# Patient Record
Sex: MALE | Race: WHITE | Employment: FULL TIME | URBAN - METROPOLITAN AREA
[De-identification: names, ages, dates, MRNs, and addresses within clinical notes are randomized per-mention and may not be internally consistent; named-entity substitution may affect disease eponyms.]

---

## 2020-12-01 ENCOUNTER — CLINICAL SUPPORT (OUTPATIENT)
Dept: URGENT CARE | Facility: CLINIC | Age: 60
End: 2020-12-01
Payer: COMMERCIAL

## 2020-12-01 VITALS — TEMPERATURE: 98 F

## 2020-12-01 DIAGNOSIS — Z11.9 SCREENING EXAMINATION FOR UNSPECIFIED INFECTIOUS DISEASE: Primary | ICD-10-CM

## 2020-12-01 LAB
CTP QC/QA: YES
SARS-COV-2 RDRP RESP QL NAA+PROBE: NEGATIVE

## 2020-12-01 PROCEDURE — U0002: ICD-10-PCS | Mod: QW,S$GLB,, | Performed by: EMERGENCY MEDICINE

## 2020-12-01 PROCEDURE — 99201 PR OFFICE/OUTPT VISIT,NEW,LEVL I: ICD-10-PCS | Mod: S$GLB,,, | Performed by: EMERGENCY MEDICINE

## 2020-12-01 PROCEDURE — 99201 PR OFFICE/OUTPT VISIT,NEW,LEVL I: CPT | Mod: S$GLB,,, | Performed by: EMERGENCY MEDICINE

## 2020-12-01 PROCEDURE — U0002 COVID-19 LAB TEST NON-CDC: HCPCS | Mod: QW,S$GLB,, | Performed by: EMERGENCY MEDICINE

## 2021-08-05 ENCOUNTER — CLINICAL SUPPORT (OUTPATIENT)
Dept: URGENT CARE | Facility: CLINIC | Age: 61
End: 2021-08-05
Payer: COMMERCIAL

## 2021-08-05 DIAGNOSIS — Z13.9 ENCOUNTER FOR SCREENING: Primary | ICD-10-CM

## 2021-08-05 LAB
CTP QC/QA: YES
SARS-COV-2 RDRP RESP QL NAA+PROBE: NEGATIVE

## 2021-08-05 PROCEDURE — U0002 COVID-19 LAB TEST NON-CDC: HCPCS | Mod: QW,S$GLB,, | Performed by: SURGERY

## 2021-08-05 PROCEDURE — U0002: ICD-10-PCS | Mod: QW,S$GLB,, | Performed by: SURGERY

## 2021-09-29 ENCOUNTER — CLINICAL SUPPORT (OUTPATIENT)
Dept: URGENT CARE | Facility: CLINIC | Age: 61
End: 2021-09-29
Payer: COMMERCIAL

## 2021-09-29 DIAGNOSIS — Z13.9 ENCOUNTER FOR SCREENING: Primary | ICD-10-CM

## 2021-09-29 LAB
CTP QC/QA: YES
SARS-COV-2 RDRP RESP QL NAA+PROBE: NEGATIVE

## 2021-09-29 PROCEDURE — U0002: ICD-10-PCS | Mod: QW,S$GLB,, | Performed by: NURSE PRACTITIONER

## 2021-09-29 PROCEDURE — U0002 COVID-19 LAB TEST NON-CDC: HCPCS | Mod: QW,S$GLB,, | Performed by: NURSE PRACTITIONER

## 2022-06-24 ENCOUNTER — OFFICE VISIT (OUTPATIENT)
Dept: URGENT CARE | Facility: CLINIC | Age: 62
End: 2022-06-24
Payer: COMMERCIAL

## 2022-06-24 VITALS
BODY MASS INDEX: 39.65 KG/M2 | DIASTOLIC BLOOD PRESSURE: 89 MMHG | RESPIRATION RATE: 18 BRPM | TEMPERATURE: 99 F | HEART RATE: 80 BPM | HEIGHT: 70 IN | OXYGEN SATURATION: 95 % | SYSTOLIC BLOOD PRESSURE: 153 MMHG | WEIGHT: 277 LBS

## 2022-06-24 DIAGNOSIS — U07.1 COVID-19 VIRUS DETECTED: ICD-10-CM

## 2022-06-24 DIAGNOSIS — R05.9 COUGH: ICD-10-CM

## 2022-06-24 DIAGNOSIS — U07.1 COVID-19: Primary | ICD-10-CM

## 2022-06-24 LAB
CTP QC/QA: YES
SARS-COV-2 RDRP RESP QL NAA+PROBE: POSITIVE

## 2022-06-24 PROCEDURE — U0002: ICD-10-PCS | Mod: QW,S$GLB,, | Performed by: PHYSICIAN ASSISTANT

## 2022-06-24 PROCEDURE — 99203 OFFICE O/P NEW LOW 30 MIN: CPT | Mod: S$GLB,,, | Performed by: PHYSICIAN ASSISTANT

## 2022-06-24 PROCEDURE — 1159F MED LIST DOCD IN RCRD: CPT | Mod: CPTII,S$GLB,, | Performed by: PHYSICIAN ASSISTANT

## 2022-06-24 PROCEDURE — 99203 PR OFFICE/OUTPT VISIT, NEW, LEVL III, 30-44 MIN: ICD-10-PCS | Mod: S$GLB,,, | Performed by: PHYSICIAN ASSISTANT

## 2022-06-24 PROCEDURE — 1160F PR REVIEW ALL MEDS BY PRESCRIBER/CLIN PHARMACIST DOCUMENTED: ICD-10-PCS | Mod: CPTII,S$GLB,, | Performed by: PHYSICIAN ASSISTANT

## 2022-06-24 PROCEDURE — 1160F RVW MEDS BY RX/DR IN RCRD: CPT | Mod: CPTII,S$GLB,, | Performed by: PHYSICIAN ASSISTANT

## 2022-06-24 PROCEDURE — 3077F PR MOST RECENT SYSTOLIC BLOOD PRESSURE >= 140 MM HG: ICD-10-PCS | Mod: CPTII,S$GLB,, | Performed by: PHYSICIAN ASSISTANT

## 2022-06-24 PROCEDURE — 3079F PR MOST RECENT DIASTOLIC BLOOD PRESSURE 80-89 MM HG: ICD-10-PCS | Mod: CPTII,S$GLB,, | Performed by: PHYSICIAN ASSISTANT

## 2022-06-24 PROCEDURE — 1159F PR MEDICATION LIST DOCUMENTED IN MEDICAL RECORD: ICD-10-PCS | Mod: CPTII,S$GLB,, | Performed by: PHYSICIAN ASSISTANT

## 2022-06-24 PROCEDURE — 3077F SYST BP >= 140 MM HG: CPT | Mod: CPTII,S$GLB,, | Performed by: PHYSICIAN ASSISTANT

## 2022-06-24 PROCEDURE — 3008F BODY MASS INDEX DOCD: CPT | Mod: CPTII,S$GLB,, | Performed by: PHYSICIAN ASSISTANT

## 2022-06-24 PROCEDURE — 3008F PR BODY MASS INDEX (BMI) DOCUMENTED: ICD-10-PCS | Mod: CPTII,S$GLB,, | Performed by: PHYSICIAN ASSISTANT

## 2022-06-24 PROCEDURE — U0002 COVID-19 LAB TEST NON-CDC: HCPCS | Mod: QW,S$GLB,, | Performed by: PHYSICIAN ASSISTANT

## 2022-06-24 PROCEDURE — 3079F DIAST BP 80-89 MM HG: CPT | Mod: CPTII,S$GLB,, | Performed by: PHYSICIAN ASSISTANT

## 2022-06-24 NOTE — PATIENT INSTRUCTIONS
"You have tested positive for COVID-19 today.      ISOLATION  If you tested positive and do not have symptoms, you must isolate for 5 days starting on the day of the positive test. I    If you tested positive and have symptoms, you must isolate for 5 days starting on the day of the first symptoms,  not the day of the positive test.     This is the most important part, both the CDC and the [facility] emphasize that you do not test out of isolation.     After 5 days, if your symptoms have improved and you have not had fever on day 5, you can return to the community on day 6- NO TESTING REQUIRED!      In fact, we do not retest if you were positive in the last 90 days.    After your 5 days of isolation are completed, the CDC recommends strict mask use for the first 5 days that you come out of isolation.      .[email or web address]    Please follow up with your Primary care provider within 2-5 days if your signs and symptoms have not resolved or worsen.  The usual course of cold symptoms are 10-14 days.      If your condition worsens or fails to improve we recommend that you receive another evaluation at the emergency room immediately or contact your primary medical clinic to discuss your concerns.      You must understand that you have received an Urgent Care treatment only and that you may be released before all of your medical problems are known or treated.   You, the patient, will arrange for follow up care as instructed.      Tylenol or Ibuprofen can also be used as directed for pain/fever unless you have an allergy to them or medical condition such as stomach ulcers, kidney or liver disease or blood thinners etc for which you should not be taking these type of medications.      Take over the counter cough medication as directed as needed for cough.  You should avoid medications with pseudoephedrine or phenylephrine (any medication with "D") if you have high blood pressure as this can cause an elevation in your blood pressure. Instead " consider Corcidin HBP as needed to prevent an elevated blood pressure.      Natural remedies of symptoms (as needed) include humidification, saline nasal sprays, and/or steamy showers.  Increase fluids, warm tea with honey, cough drops as needed.  You may also use salt water gargles for sore throat.     IF you received a oral steroid today - As discussed, this can elevate your blood pressure, elevate your blood sugar, water weight gain, nervous energy, redness to the face and dimpling of the skin at the injection site.

## 2022-06-24 NOTE — PROGRESS NOTES
"Subjective:       Patient ID: Christopher Ribeiro is a 62 y.o. male.    Vitals:  height is 5' 9.5" (1.765 m) and weight is 125.6 kg (277 lb). His temperature is 98.7 °F (37.1 °C). His blood pressure is 153/89 (abnormal) and his pulse is 80. His respiration is 18 and oxygen saturation is 95%.     Chief Complaint: Flu-Like symptoms    Patient provider note starts here:  Patient presents with a 4 day history of dry cough, nasal congestion, rhinorrhea, generalized malaise and fever (Tmax 100.9). He is concerned that he has COVID 19. Denies chest pain or SOB at rest. Taking Ibuprofen for the fever and a slight headache that he has. He has been vaccinated and boosted against COVID 19.     Sinus Problem  This is a new problem. The current episode started in the past 7 days (Monday ). The problem has been gradually worsening since onset. The maximum temperature recorded prior to his arrival was 100.4 - 100.9 F. The fever has been present for 1 to 2 days. His pain is at a severity of 0/10. He is experiencing no pain. Associated symptoms include congestion, coughing, headaches, sinus pressure, sneezing and a sore throat. Pertinent negatives include no chills, diaphoresis, ear pain, hoarse voice, neck pain or shortness of breath. (Ringing in ears   ) Treatments tried: Nyquil ibuprophen  The treatment provided mild relief.       Constitution: Positive for fever. Negative for chills and sweating.   HENT: Positive for congestion, postnasal drip, sinus pressure and sore throat. Negative for ear pain.    Neck: Negative for neck pain and neck stiffness.   Cardiovascular: Negative for chest pain.   Respiratory: Positive for cough. Negative for chest tightness, sputum production, shortness of breath and wheezing.    Gastrointestinal: Negative for abdominal pain, vomiting and diarrhea.   Musculoskeletal: Negative for pain.   Skin: Negative for rash and wound.   Allergic/Immunologic: Positive for sneezing. Negative for itching. "   Neurological: Positive for headaches. Negative for numbness and tingling.       Objective:      Physical Exam   Constitutional: He is oriented to person, place, and time. He appears well-developed. He is cooperative.  Non-toxic appearance. He does not appear ill. No distress.   HENT:   Head: Normocephalic and atraumatic.   Ears:   Right Ear: Hearing and external ear normal.   Left Ear: Hearing and external ear normal.      Comments: Ear were not examined in an attempt to limit exposure. The patient agreed to forgo this portion of the exam.   Nose: No mucosal edema, rhinorrhea, nasal deformity or congestion. No epistaxis. Right sinus exhibits no maxillary sinus tenderness and no frontal sinus tenderness. Left sinus exhibits no maxillary sinus tenderness and no frontal sinus tenderness.   Mouth/Throat: Uvula is midline, oropharynx is clear and moist and mucous membranes are normal. No trismus in the jaw. Normal dentition. No uvula swelling. No oropharyngeal exudate, posterior oropharyngeal edema or posterior oropharyngeal erythema.   Eyes: Conjunctivae and lids are normal. No scleral icterus.   Neck: Trachea normal and phonation normal. Neck supple. No edema present. No erythema present. No neck rigidity present.   Cardiovascular: Normal rate, regular rhythm, normal heart sounds and normal pulses.   Pulmonary/Chest: Effort normal and breath sounds normal. No respiratory distress. He has no decreased breath sounds. He has no wheezes. He has no rhonchi.   Abdominal: Normal appearance.   Musculoskeletal: Normal range of motion.         General: No deformity. Normal range of motion.   Neurological: He is alert and oriented to person, place, and time. He exhibits normal muscle tone. Coordination normal.   Skin: Skin is warm, dry, intact, not diaphoretic and not pale.   Psychiatric: His speech is normal and behavior is normal. Judgment and thought content normal.   Nursing note and vitals reviewed.        Assessment:        1. COVID-19    2. Cough        Results for orders placed or performed in visit on 06/24/22   POCT COVID-19 Rapid Screening   Result Value Ref Range    POC Rapid COVID Positive (A) Negative     Acceptable Yes        Plan:         COVID-19    Cough  -     POCT COVID-19 Rapid Screening           Medical Decision Making:   History:   Old Medical Records: I decided to obtain old medical records.  Differential Diagnosis:   Differential diagnosis includes but is not limited to: viral vs bacterial URI, pharyngitis, otitis, COVID 19, influenza, pneumonia.    Clinical Tests:   Lab Tests: Ordered and Reviewed       <> Summary of Lab: COVID+  Urgent Care Management:  Patient presents with complaints of URI like symptoms. On exam, he is afebrile and nontoxic appearing. Lungs CTAB, vitals stable. He declined Tessalon Perles today. COVID+. Isolation requirements, ED precautions and symptomatic treatment discussed. He verbalized understanding and agreed with plan.        Patient Instructions   You have tested positive for COVID-19 today.      ISOLATION  If you tested positive and do not have symptoms, you must isolate for 5 days starting on the day of the positive test. I    If you tested positive and have symptoms, you must isolate for 5 days starting on the day of the first symptoms,  not the day of the positive test.     This is the most important part, both the CDC and the [facility] emphasize that you do not test out of isolation.     After 5 days, if your symptoms have improved and you have not had fever on day 5, you can return to the community on day 6- NO TESTING REQUIRED!      In fact, we do not retest if you were positive in the last 90 days.    After your 5 days of isolation are completed, the CDC recommends strict mask use for the first 5 days that you come out of isolation.       .[email or web address]    Please follow up with your Primary care provider within 2-5 days if your signs and symptoms have not resolved or  "worsen.  The usual course of cold symptoms are 10-14 days.      If your condition worsens or fails to improve we recommend that you receive another evaluation at the emergency room immediately or contact your primary medical clinic to discuss your concerns.      You must understand that you have received an Urgent Care treatment only and that you may be released before all of your medical problems are known or treated.   You, the patient, will arrange for follow up care as instructed.      Tylenol or Ibuprofen can also be used as directed for pain/fever unless you have an allergy to them or medical condition such as stomach ulcers, kidney or liver disease or blood thinners etc for which you should not be taking these type of medications.      Take over the counter cough medication as directed as needed for cough.  You should avoid medications with pseudoephedrine or phenylephrine (any medication with "D") if you have high blood pressure as this can cause an elevation in your blood pressure. Instead consider Corcidin HBP as needed to prevent an elevated blood pressure.      Natural remedies of symptoms (as needed) include humidification, saline nasal sprays, and/or steamy showers.  Increase fluids, warm tea with honey, cough drops as needed.  You may also use salt water gargles for sore throat.     IF you received a oral steroid today - As discussed, this can elevate your blood pressure, elevate your blood sugar, water weight gain, nervous energy, redness to the face and dimpling of the skin at the injection site.            "

## 2023-03-20 ENCOUNTER — OFFICE VISIT (OUTPATIENT)
Dept: URGENT CARE | Facility: CLINIC | Age: 63
End: 2023-03-20
Payer: COMMERCIAL

## 2023-03-20 VITALS
WEIGHT: 276.88 LBS | TEMPERATURE: 99 F | BODY MASS INDEX: 39.64 KG/M2 | HEART RATE: 87 BPM | HEIGHT: 70 IN | SYSTOLIC BLOOD PRESSURE: 187 MMHG | DIASTOLIC BLOOD PRESSURE: 87 MMHG | RESPIRATION RATE: 16 BRPM | OXYGEN SATURATION: 99 %

## 2023-03-20 DIAGNOSIS — R14.0 ABDOMINAL BLOATING: Primary | ICD-10-CM

## 2023-03-20 PROCEDURE — 99213 PR OFFICE/OUTPT VISIT, EST, LEVL III, 20-29 MIN: ICD-10-PCS | Mod: S$GLB,,,

## 2023-03-20 PROCEDURE — 99213 OFFICE O/P EST LOW 20 MIN: CPT | Mod: S$GLB,,,

## 2023-03-21 NOTE — PROGRESS NOTES
"Subjective:       Patient ID: Christopher Ribeiro is a 62 y.o. male.    Vitals:  height is 5' 9.5" (1.765 m) and weight is 125.6 kg (276 lb 14.4 oz). His oral temperature is 98.7 °F (37.1 °C). His blood pressure is 187/87 (abnormal) and his pulse is 87. His respiration is 16 and oxygen saturation is 99%.     Chief Complaint: Bloated    Patient states that he had a light lunch today and now he feels bloated. He states that he cant get comfortable. He states that this happened again on Friday night as well. He states that his symptoms completely resolve but then return. He denies any changes in his diet, recent travel, recent abx use. Patient states that the last time he had a BM was this afternoon that was normal in consistency. She states that he is not in pain but feels a lot of pressure/ discomfort. He denies fever, chills, nausea, vomiting, CP, SOB.    GI Problem  The primary symptoms include abdominal pain (full sensation). Primary symptoms do not include fever, fatigue, nausea, vomiting, diarrhea, melena, hematemesis, hematochezia, dysuria, myalgias or rash.   The illness is also significant for bloating. The illness does not include chills, dysphagia, odynophagia, constipation or itching. Associated medical issues do not include inflammatory bowel disease, GERD, gallstones, liver disease, alcohol abuse, PUD, gastric bypass, bowel resection, irritable bowel syndrome or hemorrhoids.     Constitution: Negative for chills, fatigue and fever.   Cardiovascular:  Negative for chest pain and sob on exertion.   Respiratory:  Negative for shortness of breath.    Gastrointestinal:  Positive for abdominal pain (full sensation) and abdominal bloating. Negative for abdominal trauma, nausea, vomiting, constipation, diarrhea, bright red blood in stool, dark colored stools, rectal bleeding, rectal pain, hemorrhoids, heartburn and bowel incontinence.   Genitourinary:  Negative for dysuria.   Musculoskeletal:  Negative for " muscle ache.   Skin:  Negative for rash.     Objective:      Physical Exam   Constitutional:  Non-toxic appearance. He does not appear ill. No distress. obesity  Cardiovascular: Normal rate, regular rhythm and normal heart sounds.   No murmur heard.Exam reveals no gallop and no friction rub.   Pulmonary/Chest: Effort normal and breath sounds normal. No stridor. No respiratory distress. He has no wheezes. He has no rhonchi. He has no rales. He exhibits no tenderness.         Comments: Patient is in no respiratory distress. Breath sounds even, unlabored, and clear to auscultation bilaterally. No accessory muscle usage. Patient able to speak in complete sentences with ease.    Abdominal: Normal appearance and bowel sounds are normal. He exhibits no shifting dullness, no distension, no fluid wave, no pulsatile midline mass and no mass. There is no splenomegaly or hepatomegaly. There is generalized abdominal tenderness. There is no rebound, no guarding, no tenderness at McBurney's point, negative Last's sign, no left CVA tenderness, negative Rovsing's sign, negative psoas sign, no right CVA tenderness and negative obturator sign. No hernia.      Comments: Negative heel tap.   Neurological: He is alert.   Skin: Skin is not diaphoretic.   Nursing note and vitals reviewed.      Assessment:       1. Abdominal bloating          Plan:     Previous notes reviewed.  Vital signs reviewed.  Discussed abdominal bloating, home care, tx options, and given follow up precautions.  Patient was briefed on my thought process and diagnosis.   Patient involved with the treatment plan and agreed to the plan.  Patient informed on warning signs, patient understood warning signs and to go to urgent care or ER if warning signs appear.  Patient discussed with Dr. Lidya De La Cruz in clinic, Dr. De La Cruz agrees to the plan.     Acute abdomen is less likely, however patient informed that an acute abdomen can not be completely ruled out in an urgent care  setting. Patient educated on suggestions for helping with bloating and gas, given strict ER precautions and to follow up with his PCP. Patient understood and stated that he does not want to go to the ER tonight and that he will message his PCP tomorrow to start a work up.     Patient Instructions   Please return here or go to the Emergency Department for any concerns or worsening of condition.    Please follow up with your primary care doctor or specialist as needed.    If you  smoke, please stop smoking.    Abdominal bloating      KileyRebeca Barry PA-C

## 2023-03-21 NOTE — PATIENT INSTRUCTIONS
Please return here or go to the Emergency Department for any concerns or worsening of condition.    Please follow up with your primary care doctor or specialist as needed.    If you  smoke, please stop smoking.

## 2023-07-02 ENCOUNTER — HOSPITAL ENCOUNTER (EMERGENCY)
Facility: HOSPITAL | Age: 63
Discharge: HOME | End: 2023-07-02
Attending: EMERGENCY MEDICINE
Payer: COMMERCIAL

## 2023-07-02 VITALS
WEIGHT: 260 LBS | SYSTOLIC BLOOD PRESSURE: 169 MMHG | BODY MASS INDEX: 37.22 KG/M2 | TEMPERATURE: 98 F | HEART RATE: 83 BPM | OXYGEN SATURATION: 98 % | RESPIRATION RATE: 18 BRPM | DIASTOLIC BLOOD PRESSURE: 90 MMHG | HEIGHT: 70 IN
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
## 2023-07-02 DIAGNOSIS — S99.921A TOE INJURY, RIGHT, INITIAL ENCOUNTER: ICD-10-CM

## 2023-07-02 DIAGNOSIS — S09.90XA TRAUMATIC INJURY OF HEAD, INITIAL ENCOUNTER: ICD-10-CM

## 2023-07-02 DIAGNOSIS — S92.414A CLOSED NONDISPLACED FRACTURE OF PROXIMAL PHALANX OF RIGHT GREAT TOE, INITIAL ENCOUNTER: Primary | ICD-10-CM

## 2023-07-02 PROCEDURE — 25000003 PHARM REV CODE 250: Performed by: PHYSICIAN ASSISTANT

## 2023-07-02 PROCEDURE — 63600175 PHARM REV CODE 636 W HCPCS: Performed by: PHYSICIAN ASSISTANT

## 2023-07-02 PROCEDURE — 90471 IMMUNIZATION ADMIN: CPT | Performed by: PHYSICIAN ASSISTANT

## 2023-07-02 PROCEDURE — 90715 TDAP VACCINE 7 YRS/> IM: CPT | Performed by: PHYSICIAN ASSISTANT

## 2023-07-02 PROCEDURE — 99284 EMERGENCY DEPT VISIT MOD MDM: CPT | Mod: 25

## 2023-07-02 RX ORDER — MORPHINE SULFATE 15 MG/1
15 TABLET ORAL
Status: COMPLETED | OUTPATIENT
Start: 2023-07-02 | End: 2023-07-02

## 2023-07-02 RX ORDER — BACITRACIN ZINC 500 UNIT/G
OINTMENT (GRAM) TOPICAL 2 TIMES DAILY
Qty: 30 G | Refills: 0 | Status: SHIPPED | OUTPATIENT
Start: 2023-07-02

## 2023-07-02 RX ORDER — ONDANSETRON 4 MG/1
4 TABLET, ORALLY DISINTEGRATING ORAL
Status: COMPLETED | OUTPATIENT
Start: 2023-07-02 | End: 2023-07-02

## 2023-07-02 RX ADMIN — TETANUS TOXOID, REDUCED DIPHTHERIA TOXOID AND ACELLULAR PERTUSSIS VACCINE, ADSORBED 0.5 ML: 5; 2.5; 8; 8; 2.5 SUSPENSION INTRAMUSCULAR at 07:07

## 2023-07-02 RX ADMIN — ONDANSETRON 4 MG: 4 TABLET, ORALLY DISINTEGRATING ORAL at 07:07

## 2023-07-02 RX ADMIN — MORPHINE SULFATE 15 MG: 15 TABLET ORAL at 07:07

## 2023-07-03 NOTE — ED PROVIDER NOTES
Encounter Date: 7/2/2023       History     Chief Complaint   Patient presents with    Fall     Trip and fall. Reports right great toe pain.      The history is provided by the patient and medical records. No  was used.     Christopher Ribeiro is a 63 y.o. male with no reported medical history presenting to the ED with the chief complaint of fall.     Reports mechanical, non-syncopal, ground-level fall this morning at 9am while walking into  [employer]. Reports tripping over unleveled concrete causing him to fall forward and hit his head against the ground. Spouse was with him and declines LOC. Additionally reports R great toe pain and sustained a superficial abrasion. Denies headache, neck pain, chest pain, abdominal pain, back pain, pelvic pain. No vision changes, speech changes, numbness, paresthesias, extremity weakness. Reports last tetanus was >10 years ago. Reports recently completing a course of ABx for an infection involving his 4th and 5th toe on his L foot. Denies history of DM.     Review of patient's allergies indicates:  No Known Allergies  History reviewed. No pertinent past medical history.  History reviewed. No pertinent surgical history.  History reviewed. No pertinent family history.  Social History     Tobacco Use    Smoking status: Never    Smokeless tobacco: Never     Review of Systems   Musculoskeletal:  Positive for arthralgias.     Physical Exam     Initial Vitals [07/02/23 1810]   BP Pulse Resp Temp SpO2   (!) 168/83 99 16 98.1 °F (36.7 °C) 98 %      MAP       --         Physical Exam    Constitutional: He appears well-developed and well-nourished. He is not diaphoretic. He is easily aroused.   HENT:   Head: Normocephalic and atraumatic.   Mouth/Throat: Oropharynx is clear and moist. No oropharyngeal exudate.   Hematoma with overlying abrasion to superior aspect of forehead. Small abrasion to nasal bridge. No septal hematoma.   Eyes: EOM and lids are normal. Pupils are  equal, round, and reactive to light. No scleral icterus.   Neck: Phonation normal. Neck supple. No stridor present.   Normal range of motion.  Cardiovascular:  Normal rate and regular rhythm.           Pulmonary/Chest: Breath sounds normal. No stridor. No respiratory distress. He has no wheezes. He has no rales.   Abdominal: Abdomen is soft. He exhibits no distension. There is no abdominal tenderness. There is no rebound.   Musculoskeletal:         General: No tenderness or edema. Normal range of motion.      Cervical back: Normal range of motion and neck supple.      Comments: R great toe - superficial skin tear to medial aspect. Ecchymosis to medial base. Pain with ROM.   +2 DP pulses BL  No snuff box tenderness. No midline spinal tenderness.     Neurological: He is alert, oriented to person, place, and time and easily aroused. He has normal strength. No sensory deficit.   Skin: Skin is warm and dry. No rash noted. No erythema.   Psychiatric: He has a normal mood and affect. His speech is normal.       ED Course   Procedures  Labs Reviewed   HIV 1 / 2 ANTIBODY   HEPATITIS C ANTIBODY          Imaging Results              CT Head Without Contrast (Final result)  Result time 07/02/23 20:38:02      Final result by Benito Polanco MD (07/02/23 20:38:02)                   Impression:      No acute intracranial abnormalities.      Electronically signed by: Benito Polanco MD  Date:    07/02/2023  Time:    20:38               Narrative:    EXAMINATION:  CT HEAD WITHOUT CONTRAST    CLINICAL HISTORY:  Head trauma, moderate-severe;    TECHNIQUE:  Low dose axial images were obtained through the head.  Coronal and sagittal reformations were also performed. Contrast was not administered.    COMPARISON:  None.    FINDINGS:  The brain parenchyma appears normal for age with good corticomedullary differentiation.  There is no evidence of acute infarct, hemorrhage, or mass.  The ventricular system is normal in size.  No  mass-effect or midline shift.  There are no abnormal extra-axial fluid collections.  The paranasal sinuses and mastoid air cells are clear.  The calvarium appears intact.  .                                       X-Ray Foot Complete Right (Final result)  Result time 07/02/23 20:12:37      Final result by Neal Solis MD (07/02/23 20:12:37)                   Impression:      First proximal phalanx suspected acute nondisplaced fracture with intra-articular extension, as above.      Electronically signed by: Neal Solis MD  Date:    07/02/2023  Time:    20:12               Narrative:    EXAMINATION:  XR FOOT COMPLETE 3 VIEW RIGHT    CLINICAL HISTORY:  . Unspecified injury of right foot, initial encounter    TECHNIQUE:  AP, lateral, and oblique views of the right foot were performed.    COMPARISON:  None    FINDINGS:  Overall alignment is within normal limits.  Generalized osteopenia.  Lisfranc articulation is congruent.  Subtle radiolucency with slight cortical step-off at the medial aspect of the base of the 1st proximal phalanx concerning for nondisplaced fracture.  Fracture plane appears to extend to the articular surface at the 1st MTP joint.  No dislocation or destructive osseous process.  Baseline minimal DJD.  Small plantar calcaneal spur.  Small enthesophyte at the Achilles insertion site.  There is suspected osseous fusion at the distal tibiofibular syndesmosis.  No subcutaneous emphysema or radiodense retained foreign body.                                       Medications   Tdap (BOOSTRIX) vaccine injection 0.5 mL (0.5 mLs Intramuscular Given 7/2/23 1957)   morphine tablet 15 mg (15 mg Oral Given 7/2/23 1956)   ondansetron disintegrating tablet 4 mg (4 mg Oral Given 7/2/23 1956)     Medical Decision Making:   History:   Old Medical Records: I decided to obtain old medical records.  Old Records Summarized: records from clinic visits and records from previous admission(s).  Initial Assessment:   63 y.o.  male with no reported medical history presenting to the ED c/o forehead hematoma and R toe pain s/p mechanical, non-syncopal, ground-level fall this morning.  Differential Diagnosis:   Skin tear, contusion, ligament injury, fracture, dislocation, nerve injury, arterial injury, traumatic brain injury, concussion, skull fracture.   Clinical Tests:   Radiological Study: Ordered and Reviewed  ED Management:  Emergency room management documented as below or in the ED course.           ED Course as of 07/02/23 2138   Sun Jul 02, 2023 2120 X-ray showing first proximal phalanx suspected acute nondisplaced fracture with intra-articular extension [BA]   2120 CT head without acute intracranial findings. [BA]   2120 Walking boot provided for toe fracture. Ambulatory referral placed for podiatry follow-up. Bacitracin RX provided for patient to apply over skin abrasions. Patient expresses understanding and agreeable to the plan. Return to ED precautions given for new, worsening, or concerning symptoms. I have discussed the care of this patient with my supervising physician.  [BA]      ED Course User Index  [BA] Neal Bustos PA-C                 Clinical Impression:   Final diagnoses:  [S99.921A] Toe injury, right, initial encounter  [S99.921A] Toe injury, right, initial encounter - great toe  [S92.414A] Closed nondisplaced fracture of proximal phalanx of right great toe, initial encounter (Primary)  [W19.XXXA] Fall, initial encounter  [S09.90XA] Traumatic injury of head, initial encounter        ED Disposition Condition    Discharge Stable          ED Prescriptions       Medication Sig Dispense Start Date End Date Auth. Provider    bacitracin 500 unit/gram Oint Apply topically 2 (two) times daily. 30 g 7/2/2023 -- Neal Bustos PA-C          Follow-up Information       Follow up With Specialties Details Why Contact Info Additional Information    [id number]   [address]  [place]  [place] [id number] [phone] [facility] - Main Building, 5th Floor Please park in [facility] [address] and use [address] elevator             Neal Bustos PA-C  07/02/23 7509

## 2023-07-03 NOTE — DISCHARGE INSTRUCTIONS
Follow-up with podiatry regarding your toe fracture. Use the provided walking boot for support. Apply topical antibiotic ointment over your wound to prevent infection.     Follow-up with your primary care provider for further evaluation.     Return to the emergency room for new, worsening, or concerning symptoms.

## 2023-07-03 NOTE — ED NOTES
Christopher Ribeiro, a 63 y.o. male presents to the ED via POV with friend/family w/ complaint of  right big toe pain following mechanical fall. States fell and hit front of head head resulting in minor abrasion. Neg LOC, neg anticoag. Denies neck pain    Triage note:  Chief Complaint   Patient presents with    Fall     Trip and fall. Reports right great toe pain.      Review of patient's allergies indicates:  No Known Allergies  History reviewed. No pertinent past medical history.

## 2023-10-24 ENCOUNTER — CLINICAL SUPPORT (OUTPATIENT)
Dept: URGENT CARE | Facility: CLINIC | Age: 63
End: 2023-10-24
Payer: COMMERCIAL

## 2023-10-24 DIAGNOSIS — Z23 NEEDS FLU SHOT: Primary | ICD-10-CM

## 2023-10-24 PROCEDURE — 90471 FLU VACCINE (QUAD) GREATER THAN OR EQUAL TO 3YO PRESERVATIVE FREE IM: ICD-10-PCS | Mod: S$GLB,,, | Performed by: INTERNAL MEDICINE

## 2023-10-24 PROCEDURE — 90686 FLU VACCINE (QUAD) GREATER THAN OR EQUAL TO 3YO PRESERVATIVE FREE IM: ICD-10-PCS | Mod: S$GLB,,, | Performed by: INTERNAL MEDICINE

## 2023-10-24 PROCEDURE — 90471 IMMUNIZATION ADMIN: CPT | Mod: S$GLB,,, | Performed by: INTERNAL MEDICINE

## 2023-10-24 PROCEDURE — 90686 IIV4 VACC NO PRSV 0.5 ML IM: CPT | Mod: S$GLB,,, | Performed by: INTERNAL MEDICINE

## 2024-01-16 ENCOUNTER — OFFICE VISIT (OUTPATIENT)
Dept: URGENT CARE | Facility: CLINIC | Age: 64
End: 2024-01-16
Payer: COMMERCIAL

## 2024-01-16 VITALS
WEIGHT: 259.94 LBS | SYSTOLIC BLOOD PRESSURE: 181 MMHG | TEMPERATURE: 99 F | BODY MASS INDEX: 37.21 KG/M2 | HEIGHT: 70 IN | HEART RATE: 90 BPM | OXYGEN SATURATION: 96 % | RESPIRATION RATE: 18 BRPM | DIASTOLIC BLOOD PRESSURE: 88 MMHG

## 2024-01-16 DIAGNOSIS — U07.1 COVID-19 VIRUS DETECTED: ICD-10-CM

## 2024-01-16 DIAGNOSIS — U07.1 COVID: Primary | ICD-10-CM

## 2024-01-16 DIAGNOSIS — R06.7 SNEEZING: ICD-10-CM

## 2024-01-16 LAB
CTP QC/QA: YES
SARS-COV-2 AG RESP QL IA.RAPID: POSITIVE

## 2024-01-16 PROCEDURE — 99214 OFFICE O/P EST MOD 30 MIN: CPT | Mod: S$GLB,,,

## 2024-01-16 PROCEDURE — 87811 SARS-COV-2 COVID19 W/OPTIC: CPT | Mod: QW,S$GLB,,

## 2024-01-16 NOTE — LETTER
[address] [place] [address] ? [place], [address] ? Phone [phone] ? Fax [phone]           Return to Work/School    Patient: Christopher Ribeiro  YOB: 1960   Date: 01/16/2024      To Whom It May Concern:     Christopher Ribeiro was in contact with/seen in my office on 01/16/2024. COVID-19 is present in our communities across the state. Not all patients are eligible or appropriate to be tested. In this situation, your employee meets the following criteria:     Christopher Ribeiro has met the criteria for COVID-19 testing and has a POSITIVE result. He can return to work once they are asymptomatic for 24 hours without the use of fever reducing medications AND at least five days from the start of symptoms (or from the first positive result if they have no symptoms). Pt can return back to work on 1/22/2024.     If you have any questions or concerns, or if I can be of further assistance, please do not hesitate to contact me.     Sincerely,    Ladonna Jones NP

## 2024-01-16 NOTE — PROGRESS NOTES
"Subjective:      Patient ID: Christopher Ribeiro is a 63 y.o. male.    Vitals:  height is 5' 10" (1.778 m) and weight is 117.9 kg (259 lb 14.8 oz). His temperature is 98.5 °F (36.9 °C). His blood pressure is 181/88 (abnormal) and his pulse is 90. His respiration is 18 and oxygen saturation is 96%.     Chief Complaint: URI    Tested positive for COVID-19 yesterday. He reports fatigue, runny nose, mild cough. He has been taking dayquil and nyquil.     URI   This is a new problem. The current episode started yesterday. The problem has been gradually worsening. Associated symptoms include congestion, coughing, rhinorrhea and sneezing. Pertinent negatives include no abdominal pain, chest pain, diarrhea, dysuria, ear pain, headaches, joint pain, joint swelling, nausea, neck pain, plugged ear sensation, rash, sore throat, swollen glands, vomiting or wheezing. Treatments tried: day quil and night quil.       Constitution: Positive for fatigue. Negative for chills and fever.   HENT:  Positive for congestion. Negative for ear pain, sinus pressure and sore throat.    Neck: Negative for neck pain.   Cardiovascular:  Negative for chest pain.   Eyes:  Negative for vision loss, double vision and blurred vision.   Respiratory:  Positive for cough. Negative for shortness of breath and wheezing.    Gastrointestinal:  Negative for abdominal pain, nausea, vomiting and diarrhea.   Genitourinary:  Negative for dysuria.   Musculoskeletal:  Negative for muscle ache.   Skin:  Negative for rash.   Allergic/Immunologic: Positive for sneezing.   Neurological:  Negative for headaches.      Objective:     Physical Exam   Constitutional: He is oriented to person, place, and time. He appears well-developed. He is cooperative.  Non-toxic appearance. He does not appear ill. No distress.   HENT:   Head: Normocephalic and atraumatic.   Ears:   Right Ear: Hearing, tympanic membrane, external ear and ear canal normal.   Left Ear: Hearing, tympanic " membrane, external ear and ear canal normal.   Nose: Rhinorrhea and congestion present. No mucosal edema or nasal deformity. No epistaxis. Right sinus exhibits no maxillary sinus tenderness and no frontal sinus tenderness. Left sinus exhibits no maxillary sinus tenderness and no frontal sinus tenderness.   Mouth/Throat: Uvula is midline, oropharynx is clear and moist and mucous membranes are normal. No trismus in the jaw. Normal dentition. No uvula swelling. No oropharyngeal exudate, posterior oropharyngeal edema or posterior oropharyngeal erythema.   Eyes: Conjunctivae and lids are normal. Pupils are equal, round, and reactive to light. No scleral icterus.   Neck: Trachea normal and phonation normal. Neck supple. No edema present. No erythema present. No neck rigidity present.   Cardiovascular: Normal rate, regular rhythm, normal heart sounds and normal pulses.   Pulmonary/Chest: Effort normal and breath sounds normal. No stridor. No respiratory distress. He has no decreased breath sounds. He has no wheezes. He has no rhonchi. He has no rales.   Abdominal: Normal appearance.   Musculoskeletal: Normal range of motion.         General: No deformity. Normal range of motion.   Lymphadenopathy:     He has no cervical adenopathy.   Neurological: He is alert and oriented to person, place, and time. He exhibits normal muscle tone. Coordination normal.   Skin: Skin is warm, dry, intact, not diaphoretic and not pale.   Psychiatric: His speech is normal and behavior is normal. Judgment and thought content normal.   Nursing note and vitals reviewed.      Assessment:     1. COVID    2. Sneezing        Plan:     Results for orders placed or performed in visit on 01/16/24   SARS Coronavirus 2 Antigen, POCT Manual Read   Result Value Ref Range    SARS Coronavirus 2 Antigen Positive (A) Negative     Acceptable Yes      COVID risk score: 3     COVID  -     nirmatrelvir-ritonavir 300 mg (150 mg x 2)-100 mg copackaged  tablets (EUA); Take 3 tablets by mouth 2 (two) times daily for 5 days. Each dose contains 2 nirmatrelvir (pink tablets) and 1 ritonavir (white tablet). Take all 3 tablets together  Dispense: 30 tablet; Refill: 0    Sneezing  -     SARS Coronavirus 2 Antigen, POCT Manual Read            Discussed results/diagnosis/plan with patient in clinic. Strict precautions given to patient to monitor for worsening signs and symptoms. Advised to follow up with PCP or specialist.  Explained side effects of medications prescribed with patient and informed him/her to discontinue use if he/she has any side effects and to inform UC or PCP if this occurs. All questions answered. Strict ED verses clinic return precautions stressed and given in depth. Advised if symptoms worsens of fail to improve he/she should go to the Emergency Room. Discharge and follow-up instructions given verbally/printed with the patient who expressed understanding and willingness to comply with my recommendations. Patient voiced understanding and in agreement with current treatment plan. Patient exits the exam room in no acute distress. Conversant and engaged during discharge discussion, verbalized understanding.

## 2024-01-17 NOTE — PATIENT INSTRUCTIONS
You are COVID positive today. Take anti-viral (Paxlovid) twice a day for the next 5 days to decrease symptoms. Side/adverse effects discussed.     The CDC recommends isolating at home 5 days from symptom start then wear a mask in public for 5 days afterwards.     Try a decongestant and corticosteroid nasal spray like flonase for the next few days for sinus relief. Initial: 2 sprays in each nostril once daily for 1 week. Reduce to 1 spray in each nostril once per day. Stop taking if you develop a nose bleed. Nasal saline spray can be used together with flonase to help moisten nostrils. An antihistamine like zyrtec, claritin, allegra can also be helpful for sinus relief and will help dry nasal passages.     Regular (Guaifenesin) Mucinex 1200 mg twice per day for 10 days can help thin secretions for better clearance. Drink plenty of fluids with this.     Honey is a natural cough suppressant.     -If you do NOT have high blood pressure, you may use a decongestant form (D)  of this medication (ie. Claritin- D, zyrtec-D, allegra-D, Mucinex-D) or if you do not take the D form, you can take sudafed (pseudoephedrine) over the counter, which is a powerful decongestant. Do NOT take two decongestant (D) medications at the same time (such as mucinex-D and claritin-D or plain sudafed and claritin D). Dextromethorphan (DM) is a cough suppressant over the counter (ie. mucinex DM, robitussin, delsym; dayquil/nyquil has DM as well.) Try Afrin for nasal congestion at bedtime. Only use for 3 days as this can cause a rebound of congestion. Try cepacol for sore throat.     If you do have Hypertension or palpitations, it is safe to take Coricidin HBP (multi-symptom flu) for relief of sinus symptoms.  Try DASH diet to help lower BP and buy a blood pressure cuff for home monitoring. Check blood pressure at least 2 times per day and create a log. Avoid eating foods that are high in salt. Eat more foods with potassium, magnesium and calcium  which will help dilate your vessels and decrease your BP.     Warm tea/warm liquids will help soothe the back of your throat. Warm water salt gurgles can also be helpful. A dry throat will cause pain. Make sure to stay hydrated. Water and pedilyte are the best to drink. Neti pot irrigation, humidifier in your room, avoiding fans, warm compresses to face, eating/drinking hot soups, hot shower before bedtime can help.     The recommended daily fluid intake for men is 3.7 liters (seven 16 oz bottles).    Alternate Tylenol and ibuprofen every 4 hours as needed for fever and body aches.  Please take NSAIDs with a full glass of water and food to avoid GI upset.      Please only use over the counter cough and cold medications for 3-5 days at a time to avoid rebound symptoms.     Getting plenty of rest can aid in a faster recovery of illnesses.     Please follow-up with your primary care provider or return to the clinic if not better/worsening symptoms in 1 week.     Report to the ER if you have chest pain, shortness of breath, palpitations.                 Elevated BP  Check your BP twice per day and keep a log.      Try a DASH diet. I have attached information in your discharge paperwork to review. A Mediterranean diet will help to decrease inflammation and would help lower your BP. Increase water intake to help your body get rid of an increase in salt. Avoid alcohol as this can increase your BP. Increase aerobic activity to 3 times per week and 45 min at a time. Practice good sleep hygiene by sleeping in a dark, cool, comfortable bed. No tv/device use before bed or tv while sleeping. It is recommended to get 7-8 hours of uninterrupted sleep per night.     The recommended daily fluid intake for men is 3.7 liters (seven 16 oz bottles).    Follow up with your PCP for further management.     Go to the ER if your BP is consistently >160/100 and symptomatic with a severe headache, vomiting and unable to tolerate fluids,  numbness/tingling/weakness to body, difficulty speaking, balance difficulty, confusion.          ([facility] recommendation)  The Mediterranean diet is based on plant-based foods and healthy fats.     You eat LOTS of vegetables, fruits, beans, lentils, nuts, whole grains (wheat bread, brown rice) & extra virgin olive oil.   ---- These foods are high in fiber and antioxidants.   ---- These nutrients help reduce inflammation.   ---- Fiber helps regulate bowel movements.   ---- Antioxidants protect you against cancer.     Eat a moderate amount of fish (salmon, herring, mackerel, sardines, anchovies, halibut, rainbow trout, tuna)   ---- (fish are high in omega-3 fatty acids)    Eat a moderate amount of cheese and yogurt.    Eat little or no meat-- eat more poultry (baked chicken, grilled chicken, ground turkey)  ---- avoid red meat and pork (causes inflammation and linked to colon cancer)    Eat little or no sweats, sugary drinks or butter.     Limit your sodium intake. A diet high in salt can increase your blood pressure and predisposes you to a heart attack or stroke.     BENEFITS OF DIET  --- Lowers your risk of cardiovascular disease (heart attack, stroke) and many other diseases.   --- Supports a healthy body weight.   --- Supports a healthy blood sugar, blood pressure and cholesterol.   --- Lowers your risk of metabolic syndrome   --- Supports a heathy balance of good gut bacteria in your digestive system.   --- Lowers your risk for cancer.   --- Helps with brain function and slows decline as we age.   --- Helps you live longer.     Talk to your primary care doctor about a dietician referral for further guidance.

## 2024-08-25 NOTE — PROGRESS NOTES
[facility] Progress Note  8/26/2024          Reason for Visit:     Mr.Del Combs had concerns including Establish Care (/).       History of Present Illness:       Pleasant patient is here today to establish primary care.      Trigger finger, right middle finger  Patient has been having problems with triggering of his middle finger on his right hand ongoing for a couple of years now.  It tends to be worse in the winter and better in the summer and that is not bothering him as bad right now as it sometimes does.  He does speak American sign language and says that this can present challenges when he is trying to sign.  We discussed the potential for procedures by Orthopedics but right now it is not bothering him bad enough to want to pursue that.      Left olecranon bursitis  He did also have some swelling of his left elbow that he said had gone on for quite a while.  It is almost completely resolved now other than some mild tenderness still at the area.  He says he can not recall any particular kind of injury, but he thinks that it is possible that he could have hit his elbow at some point.  He does not recall resting it on a hard surface for any prolonged period of time.  Again, this is not bothering him bad enough right now to want to pursue further evaluation but we advised that orthopedist maybe able to drain/inject the bursa if this recurred.        Elevated blood pressure without hypertension  Patient says that he has been told in the past that his blood pressure has been elevated but he has never been on medication for it.  He has not been seeing a doctor regularly.  Today's blood pressure was initially elevated and was actually higher on manual recheck.  He is agreeable to get a home blood pressure cuff and log readings there for the next several weeks.  We will get my staff to reach out to them in a few weeks to check home readings and consider an antihypertensive if the home readings remain  elevated      Weight management   We discussed strategies for working on weight management.  We did discuss GLP 1 agonist and he is not sure if he would want to pursue that at this time.  He wants to think about it.  His  is on the shots and has done well but he is not sure if he wants to pursue that at this time.  Says he will reach back out to us if he decides he does want to do that.  In meantime we counseled on food logging, calorie counting, increase physical activity.    HM  Had 1st shingrix at [facility]-plans to get the 2nd  Encouraged RSV vaccine at pharmacy  He was agreeable to get routine labs which we ordered today          Problem List:     Patient Active Problem List   Diagnosis    Class 3 obesity         Surgical History:     He has a past surgical history that includes None.      Family History:      His family history includes Diabetes type I in his nephew; Kidney cancer in his father; Lung cancer in his mother.      Social History:     He reports that he has never smoked. He has never been exposed to tobacco smoke. He has never used smokeless tobacco. He reports current alcohol use. He reports that he does not use drugs.      Medications:     No current outpatient medications on file.        Allergies:   He has No Known Allergies.      Review of Systems:     Review of Systems   Constitutional:  Negative for activity change and unexpected weight change.   HENT:  Negative for hearing loss, rhinorrhea and trouble swallowing.    Eyes:  Negative for discharge and visual disturbance.   Respiratory:  Negative for chest tightness and wheezing.    Cardiovascular:  Negative for chest pain and palpitations.   Gastrointestinal:  Negative for blood in stool, constipation, diarrhea and vomiting.   Endocrine: Negative for polydipsia and polyuria.   Genitourinary:  Negative for difficulty urinating, hematuria and urgency.   Musculoskeletal:  Positive for arthralgias. Negative for joint swelling and neck pain.  "  Neurological:  Negative for weakness and headaches.   Psychiatric/Behavioral:  Negative for confusion and dysphoric mood.      Physical Exam:     Temp:             Blood Pressure:  (!) 156/78        Pulse:   88     Respirations:  14  Weight:   133 kg (293 lb 3.4 oz)  Height:   5' 10" (1.778 m)  BMI:     Body mass index is 42.07 kg/m².    Physical Exam  Constitutional:       General: He is not in acute distress.  HENT:      Right Ear: Tympanic membrane normal.      Left Ear: Tympanic membrane normal.      Nose: No congestion or rhinorrhea.      Mouth/Throat:      Pharynx: No oropharyngeal exudate or posterior oropharyngeal erythema.   Cardiovascular:      Rate and Rhythm: Normal rate and regular rhythm.   Pulmonary:      Effort: Pulmonary effort is normal. No respiratory distress.      Breath sounds: No wheezing.   Abdominal:      Palpations: Abdomen is soft.      Tenderness: There is no abdominal tenderness.   Skin:     General: Skin is warm.   Neurological:      General: No focal deficit present.      Mental Status: He is alert and oriented to person, place, and time.       Assessment and Plan:     1. Well adult exam  - CBC Auto Differential; Future  - Comprehensive Metabolic Panel; Future  - Lipid Panel; Future  - Hemoglobin A1C; Future  - TSH; Future    2. Class 3 obesity  Patient is going to work on lower calorie diet, increased exercise and he will consider GLP 1 agonist and let us know if he is interested in trying that    3. Trigger middle finger of right hand  Can provide referral to hand orthopedists if it some point he decides he wants to pursue this further.      4. Olecranon bursitis, left elbow  Almost resolved now.  Encouraged NSAIDs as needed.  If it recurs we could refer to orthopedist to discuss injections/drainage    RTC 12 mos or sooner garima Sotelo MD  8/26/2024    This note was prepared using voice-recognition software.  Although efforts are made to proofread the note, some errors " may persist in the final document.

## 2024-08-26 ENCOUNTER — OFFICE VISIT (OUTPATIENT)
Dept: PRIMARY CARE CLINIC | Facility: CLINIC | Age: 64
End: 2024-08-26
Payer: COMMERCIAL

## 2024-08-26 ENCOUNTER — LAB VISIT (OUTPATIENT)
Dept: LAB | Facility: HOSPITAL | Age: 64
End: 2024-08-26
Attending: INTERNAL MEDICINE
Payer: COMMERCIAL

## 2024-08-26 VITALS
BODY MASS INDEX: 41.97 KG/M2 | WEIGHT: 293.19 LBS | DIASTOLIC BLOOD PRESSURE: 78 MMHG | OXYGEN SATURATION: 100 % | SYSTOLIC BLOOD PRESSURE: 156 MMHG | RESPIRATION RATE: 14 BRPM | HEART RATE: 88 BPM | HEIGHT: 70 IN

## 2024-08-26 DIAGNOSIS — E66.01 CLASS 3 OBESITY: ICD-10-CM

## 2024-08-26 DIAGNOSIS — M70.22 OLECRANON BURSITIS, LEFT ELBOW: ICD-10-CM

## 2024-08-26 DIAGNOSIS — Z00.00 WELL ADULT EXAM: Primary | ICD-10-CM

## 2024-08-26 DIAGNOSIS — M65.331 TRIGGER MIDDLE FINGER OF RIGHT HAND: ICD-10-CM

## 2024-08-26 DIAGNOSIS — Z00.00 WELL ADULT EXAM: ICD-10-CM

## 2024-08-26 PROBLEM — E66.813 CLASS 3 OBESITY: Status: ACTIVE | Noted: 2024-08-26

## 2024-08-26 LAB
ALBUMIN SERPL BCP-MCNC: 3.6 G/DL (ref 3.5–5.2)
ALP SERPL-CCNC: 93 U/L (ref 55–135)
ALT SERPL W/O P-5'-P-CCNC: 29 U/L (ref 10–44)
ANION GAP SERPL CALC-SCNC: 11 MMOL/L (ref 8–16)
AST SERPL-CCNC: 20 U/L (ref 10–40)
BASOPHILS # BLD AUTO: 0.09 K/UL (ref 0–0.2)
BASOPHILS NFR BLD: 1.2 % (ref 0–1.9)
BILIRUB SERPL-MCNC: 0.5 MG/DL (ref 0.1–1)
BUN SERPL-MCNC: 14 MG/DL (ref 8–23)
CALCIUM SERPL-MCNC: 9.4 MG/DL (ref 8.7–10.5)
CHLORIDE SERPL-SCNC: 107 MMOL/L (ref 95–110)
CHOLEST SERPL-MCNC: 123 MG/DL (ref 120–199)
CHOLEST/HDLC SERPL: 3.5 {RATIO} (ref 2–5)
CO2 SERPL-SCNC: 22 MMOL/L (ref 23–29)
CREAT SERPL-MCNC: 1.2 MG/DL (ref 0.5–1.4)
DIFFERENTIAL METHOD BLD: ABNORMAL
EOSINOPHIL # BLD AUTO: 0.2 K/UL (ref 0–0.5)
EOSINOPHIL NFR BLD: 2.4 % (ref 0–8)
ERYTHROCYTE [DISTWIDTH] IN BLOOD BY AUTOMATED COUNT: 13.6 % (ref 11.5–14.5)
EST. GFR  (NO RACE VARIABLE): >60 ML/MIN/1.73 M^2
ESTIMATED AVG GLUCOSE: 128 MG/DL (ref 68–131)
GLUCOSE SERPL-MCNC: 96 MG/DL (ref 70–110)
HBA1C MFR BLD: 6.1 % (ref 4–5.6)
HCT VFR BLD AUTO: 46.1 % (ref 40–54)
HDLC SERPL-MCNC: 35 MG/DL (ref 40–75)
HDLC SERPL: 28.5 % (ref 20–50)
HGB BLD-MCNC: 14.6 G/DL (ref 14–18)
IMM GRANULOCYTES # BLD AUTO: 0.04 K/UL (ref 0–0.04)
IMM GRANULOCYTES NFR BLD AUTO: 0.5 % (ref 0–0.5)
LDLC SERPL CALC-MCNC: 65.2 MG/DL (ref 63–159)
LYMPHOCYTES # BLD AUTO: 2.3 K/UL (ref 1–4.8)
LYMPHOCYTES NFR BLD: 31 % (ref 18–48)
MCH RBC QN AUTO: 26.4 PG (ref 27–31)
MCHC RBC AUTO-ENTMCNC: 31.7 G/DL (ref 32–36)
MCV RBC AUTO: 84 FL (ref 82–98)
MONOCYTES # BLD AUTO: 0.6 K/UL (ref 0.3–1)
MONOCYTES NFR BLD: 7.9 % (ref 4–15)
NEUTROPHILS # BLD AUTO: 4.2 K/UL (ref 1.8–7.7)
NEUTROPHILS NFR BLD: 57 % (ref 38–73)
NONHDLC SERPL-MCNC: 88 MG/DL
NRBC BLD-RTO: 0 /100 WBC
PLATELET # BLD AUTO: 268 K/UL (ref 150–450)
PMV BLD AUTO: 9.3 FL (ref 9.2–12.9)
POTASSIUM SERPL-SCNC: 4.5 MMOL/L (ref 3.5–5.1)
PROT SERPL-MCNC: 7.6 G/DL (ref 6–8.4)
RBC # BLD AUTO: 5.52 M/UL (ref 4.6–6.2)
SODIUM SERPL-SCNC: 140 MMOL/L (ref 136–145)
TRIGL SERPL-MCNC: 114 MG/DL (ref 30–150)
TSH SERPL DL<=0.005 MIU/L-ACNC: 2.62 UIU/ML (ref 0.4–4)
WBC # BLD AUTO: 7.44 K/UL (ref 3.9–12.7)

## 2024-08-26 PROCEDURE — 84443 ASSAY THYROID STIM HORMONE: CPT | Performed by: INTERNAL MEDICINE

## 2024-08-26 PROCEDURE — 83036 HEMOGLOBIN GLYCOSYLATED A1C: CPT | Performed by: INTERNAL MEDICINE

## 2024-08-26 PROCEDURE — 85025 COMPLETE CBC W/AUTO DIFF WBC: CPT | Performed by: INTERNAL MEDICINE

## 2024-08-26 PROCEDURE — 36415 COLL VENOUS BLD VENIPUNCTURE: CPT | Mod: PN | Performed by: INTERNAL MEDICINE

## 2024-08-26 PROCEDURE — 99999 PR PBB SHADOW E&M-EST. PATIENT-LVL III: CPT | Mod: PBBFAC,,, | Performed by: INTERNAL MEDICINE

## 2024-08-26 PROCEDURE — 3078F DIAST BP <80 MM HG: CPT | Mod: CPTII,S$GLB,, | Performed by: INTERNAL MEDICINE

## 2024-08-26 PROCEDURE — 3008F BODY MASS INDEX DOCD: CPT | Mod: CPTII,S$GLB,, | Performed by: INTERNAL MEDICINE

## 2024-08-26 PROCEDURE — 1159F MED LIST DOCD IN RCRD: CPT | Mod: CPTII,S$GLB,, | Performed by: INTERNAL MEDICINE

## 2024-08-26 PROCEDURE — 3077F SYST BP >= 140 MM HG: CPT | Mod: CPTII,S$GLB,, | Performed by: INTERNAL MEDICINE

## 2024-08-26 PROCEDURE — 80053 COMPREHEN METABOLIC PANEL: CPT | Performed by: INTERNAL MEDICINE

## 2024-08-26 PROCEDURE — 99386 PREV VISIT NEW AGE 40-64: CPT | Mod: S$GLB,,, | Performed by: INTERNAL MEDICINE

## 2024-08-26 PROCEDURE — 80061 LIPID PANEL: CPT | Performed by: INTERNAL MEDICINE

## 2024-09-23 ENCOUNTER — TELEPHONE (OUTPATIENT)
Dept: PRIMARY CARE CLINIC | Facility: CLINIC | Age: 64
End: 2024-09-23
Payer: COMMERCIAL

## 2024-09-23 ENCOUNTER — PATIENT MESSAGE (OUTPATIENT)
Dept: PRIMARY CARE CLINIC | Facility: CLINIC | Age: 64
End: 2024-09-23
Payer: COMMERCIAL

## 2024-09-23 VITALS — SYSTOLIC BLOOD PRESSURE: 137 MMHG | DIASTOLIC BLOOD PRESSURE: 73 MMHG

## 2024-09-23 NOTE — TELEPHONE ENCOUNTER
I spoke with patient regarding updated blood pressure reading, 137/73.     Pt states he started exercising, and he walks 2-3 days out of the week, and he also has increased his water intake.

## 2024-09-23 NOTE — TELEPHONE ENCOUNTER
----- Message from Alessandro Sotelo MD sent at 8/26/2024  9:47 AM CDT -----  Regarding: Repeat BP reading  Can we reach out to patient to get updated home bp readings?  Thanks  JM

## 2024-10-08 ENCOUNTER — IMMUNIZATION (OUTPATIENT)
Dept: URGENT CARE | Facility: CLINIC | Age: 64
End: 2024-10-08
Payer: COMMERCIAL

## 2024-10-08 PROCEDURE — 90471 IMMUNIZATION ADMIN: CPT | Mod: S$GLB,,, | Performed by: NURSE PRACTITIONER

## 2024-10-08 PROCEDURE — 90656 IIV3 VACC NO PRSV 0.5 ML IM: CPT | Mod: S$GLB,,, | Performed by: NURSE PRACTITIONER

## 2024-10-10 ENCOUNTER — OFFICE VISIT (OUTPATIENT)
Dept: OPTOMETRY | Facility: CLINIC | Age: 64
End: 2024-10-10
Payer: COMMERCIAL

## 2024-10-10 DIAGNOSIS — H52.13 MYOPIA OF BOTH EYES WITH ASTIGMATISM AND PRESBYOPIA: ICD-10-CM

## 2024-10-10 DIAGNOSIS — H25.13 NUCLEAR SCLEROSIS, BILATERAL: Primary | ICD-10-CM

## 2024-10-10 DIAGNOSIS — H52.4 MYOPIA OF BOTH EYES WITH ASTIGMATISM AND PRESBYOPIA: ICD-10-CM

## 2024-10-10 DIAGNOSIS — H52.203 MYOPIA OF BOTH EYES WITH ASTIGMATISM AND PRESBYOPIA: ICD-10-CM

## 2024-10-10 DIAGNOSIS — Z97.3 WEARS CONTACT LENSES: ICD-10-CM

## 2024-10-10 PROCEDURE — 92015 DETERMINE REFRACTIVE STATE: CPT | Mod: ,,, | Performed by: OPTOMETRIST

## 2024-10-10 PROCEDURE — 3044F HG A1C LEVEL LT 7.0%: CPT | Mod: CPTII,,, | Performed by: OPTOMETRIST

## 2024-10-10 PROCEDURE — 99999 PR PBB SHADOW E&M-EST. PATIENT-LVL II: CPT | Mod: PBBFAC,,, | Performed by: OPTOMETRIST

## 2024-10-10 PROCEDURE — 1159F MED LIST DOCD IN RCRD: CPT | Mod: CPTII,,, | Performed by: OPTOMETRIST

## 2024-10-10 PROCEDURE — 92004 COMPRE OPH EXAM NEW PT 1/>: CPT | Mod: ,,, | Performed by: OPTOMETRIST

## 2024-10-10 NOTE — PROGRESS NOTES
HPI    Patient present today for eye exam for contact lens  Unknown brand of SCLs failed fit in September no information  Claims had multifocal lenses not sure of prior brand   No complaints at this time     Last edited by Shaun Samuels, OD on 10/10/2024  8:29 AM.            Assessment /Plan     For exam results, see Encounter Report.    Nuclear sclerosis, bilateral  -not visually significant  -monitor annual DFE    Myopia of both eyes with astigmatism and presbyopia  Wears contact lenses  Eyeglass Final Rx       Eyeglass Final Rx         Sphere Cylinder Axis Dist VA Add    Right -2.75 +1.25 035 20/20 +2.50    Left -2.25 +0.75 005 20/20 +2.50      Type: PAL    Expiration Date: 10/10/2025                  Contact Lens Current Rx       Current Contact Lens Rx (Trial Lens, Ordered)         Brand Base Curve Diameter Sphere Cylinder Axis Add    Right Biofinity Multifocal 8.6 14.0 -2.25 Sphere  +2.50 N    Left Biofinity Multifocal 8.6 14.0 -2.50 Sphere  +2.50 D              Current Contact Lens Rx #2 (Trial Lens, Ordered)         Brand Base Curve Diameter Sphere Cylinder Axis Add    Right Biofinity Toric 8.7 14.5 -1.50 -1.25 120     Left Biofinity Toric 8.7 14.5 -1.50 -0.75 090                   Limited Hx and no previous lenses were known  Reports 2 CLRx was MFCL and had no problems  Most recent never saw out of    Will Order trials above, challenging MF fit 2/2 astigmatism and previous failures  Ordered DVO trial #2 as 2nd option to use with readers       RTC 1 yr

## 2024-10-12 ENCOUNTER — PATIENT MESSAGE (OUTPATIENT)
Dept: OPTOMETRY | Facility: CLINIC | Age: 64
End: 2024-10-12
Payer: COMMERCIAL

## 2024-10-18 ENCOUNTER — PATIENT MESSAGE (OUTPATIENT)
Dept: OPTOMETRY | Facility: CLINIC | Age: 64
End: 2024-10-18
Payer: COMMERCIAL

## 2024-11-01 ENCOUNTER — OFFICE VISIT (OUTPATIENT)
Dept: OPTOMETRY | Facility: CLINIC | Age: 64
End: 2024-11-01
Payer: COMMERCIAL

## 2024-11-01 DIAGNOSIS — H52.4 MYOPIA OF BOTH EYES WITH ASTIGMATISM AND PRESBYOPIA: Primary | ICD-10-CM

## 2024-11-01 DIAGNOSIS — H52.13 MYOPIA OF BOTH EYES WITH ASTIGMATISM AND PRESBYOPIA: Primary | ICD-10-CM

## 2024-11-01 DIAGNOSIS — H52.203 MYOPIA OF BOTH EYES WITH ASTIGMATISM AND PRESBYOPIA: Primary | ICD-10-CM

## 2025-03-21 ENCOUNTER — PATIENT OUTREACH (OUTPATIENT)
Dept: ADMINISTRATIVE | Facility: HOSPITAL | Age: 65
End: 2025-03-21
Payer: COMMERCIAL